# Patient Record
Sex: MALE | Race: WHITE | NOT HISPANIC OR LATINO | Employment: FULL TIME | URBAN - METROPOLITAN AREA
[De-identification: names, ages, dates, MRNs, and addresses within clinical notes are randomized per-mention and may not be internally consistent; named-entity substitution may affect disease eponyms.]

---

## 2019-10-14 ENCOUNTER — RESULTS ENCOUNTER (OUTPATIENT)
Dept: FAMILY MEDICINE CLINIC | Facility: CLINIC | Age: 49
End: 2019-10-14

## 2019-10-14 ENCOUNTER — FLU SHOT (OUTPATIENT)
Dept: FAMILY MEDICINE CLINIC | Facility: CLINIC | Age: 49
End: 2019-10-14

## 2019-10-14 DIAGNOSIS — R53.83 OTHER FATIGUE: ICD-10-CM

## 2019-10-14 DIAGNOSIS — I10 HYPERTENSION, UNSPECIFIED TYPE: ICD-10-CM

## 2019-10-14 DIAGNOSIS — Z23 NEED FOR PROPHYLACTIC VACCINATION AND INOCULATION AGAINST INFLUENZA: ICD-10-CM

## 2019-10-14 DIAGNOSIS — R53.83 OTHER FATIGUE: Primary | ICD-10-CM

## 2019-10-14 DIAGNOSIS — E78.5 DYSLIPIDEMIA: ICD-10-CM

## 2019-10-14 PROBLEM — G47.33 OBSTRUCTIVE SLEEP APNEA: Status: ACTIVE | Noted: 2019-06-10

## 2019-10-14 PROBLEM — E66.9 OBESITY: Status: ACTIVE | Noted: 2019-06-10

## 2019-10-14 PROCEDURE — 90471 IMMUNIZATION ADMIN: CPT | Performed by: FAMILY MEDICINE

## 2019-10-14 PROCEDURE — 90674 CCIIV4 VAC NO PRSV 0.5 ML IM: CPT | Performed by: FAMILY MEDICINE

## 2019-10-15 ENCOUNTER — TELEPHONE (OUTPATIENT)
Dept: FAMILY MEDICINE CLINIC | Facility: CLINIC | Age: 49
End: 2019-10-15

## 2019-10-15 LAB
ALBUMIN SERPL-MCNC: 4.8 G/DL (ref 3.5–5.5)
ALBUMIN/GLOB SERPL: 2 {RATIO} (ref 1.2–2.2)
ALP SERPL-CCNC: 94 IU/L (ref 39–117)
ALT SERPL-CCNC: 35 IU/L (ref 0–44)
AMBIG ABBREV CMP14 DEFAULT: NORMAL
AMBIG ABBREV LP DEFAULT: NORMAL
AST SERPL-CCNC: 22 IU/L (ref 0–40)
BASOPHILS # BLD AUTO: 0 X10E3/UL (ref 0–0.2)
BASOPHILS NFR BLD AUTO: 0 %
BILIRUB SERPL-MCNC: 0.5 MG/DL (ref 0–1.2)
BUN SERPL-MCNC: 16 MG/DL (ref 6–24)
BUN/CREAT SERPL: 16 (ref 9–20)
CALCIUM SERPL-MCNC: 9.7 MG/DL (ref 8.7–10.2)
CHLORIDE SERPL-SCNC: 102 MMOL/L (ref 96–106)
CHOLEST SERPL-MCNC: 229 MG/DL (ref 100–199)
CO2 SERPL-SCNC: 23 MMOL/L (ref 20–29)
CREAT SERPL-MCNC: 0.98 MG/DL (ref 0.76–1.27)
EOSINOPHIL # BLD AUTO: 0.3 X10E3/UL (ref 0–0.4)
EOSINOPHIL NFR BLD AUTO: 4 %
ERYTHROCYTE [DISTWIDTH] IN BLOOD BY AUTOMATED COUNT: 13.2 % (ref 12.3–15.4)
GLOBULIN SER CALC-MCNC: 2.4 G/DL (ref 1.5–4.5)
GLUCOSE SERPL-MCNC: 100 MG/DL (ref 65–99)
HCT VFR BLD AUTO: 45.8 % (ref 37.5–51)
HDLC SERPL-MCNC: 38 MG/DL
HGB BLD-MCNC: 15.8 G/DL (ref 13–17.7)
IMM GRANULOCYTES # BLD AUTO: 0 X10E3/UL (ref 0–0.1)
IMM GRANULOCYTES NFR BLD AUTO: 0 %
LDLC SERPL CALC-MCNC: 152 MG/DL (ref 0–99)
LYMPHOCYTES # BLD AUTO: 2.3 X10E3/UL (ref 0.7–3.1)
LYMPHOCYTES NFR BLD AUTO: 31 %
MCH RBC QN AUTO: 31 PG (ref 26.6–33)
MCHC RBC AUTO-ENTMCNC: 34.5 G/DL (ref 31.5–35.7)
MCV RBC AUTO: 90 FL (ref 79–97)
MONOCYTES # BLD AUTO: 0.4 X10E3/UL (ref 0.1–0.9)
MONOCYTES NFR BLD AUTO: 5 %
NEUTROPHILS # BLD AUTO: 4.4 X10E3/UL (ref 1.4–7)
NEUTROPHILS NFR BLD AUTO: 60 %
PLATELET # BLD AUTO: 178 X10E3/UL (ref 150–450)
POTASSIUM SERPL-SCNC: 4.2 MMOL/L (ref 3.5–5.2)
PROT SERPL-MCNC: 7.2 G/DL (ref 6–8.5)
RBC # BLD AUTO: 5.1 X10E6/UL (ref 4.14–5.8)
SODIUM SERPL-SCNC: 142 MMOL/L (ref 134–144)
TRIGL SERPL-MCNC: 196 MG/DL (ref 0–149)
TSH SERPL DL<=0.005 MIU/L-ACNC: 1.3 UIU/ML (ref 0.45–4.5)
VLDLC SERPL CALC-MCNC: 39 MG/DL (ref 5–40)
WBC # BLD AUTO: 7.4 X10E3/UL (ref 3.4–10.8)

## 2019-10-15 NOTE — TELEPHONE ENCOUNTER
Called patient. No answer. Per HIPAA, may leave VM. VM left advising pt of the above and to bring all medications to appt with him.

## 2019-10-15 NOTE — TELEPHONE ENCOUNTER
----- Message from Benita Vaz MD sent at 10/15/2019  9:46 AM EDT -----  Please inform patient that cholesterol is still above goal with an LDL of 152.  His 10-year atherosclerotic cardiovascular disease risk calculation is 5.3%  it is recommended to start statin therapy when this number reaches 7.5%, which based on current other parameters will happen around the age of 52.  I do recommend low-cholesterol diet, increase physical activity and weight loss for improved neurovascular health.  Glucose was 100, otherwise labs are normal.  Please have him bring all medications to his upcoming appointment and will discuss any needed medication changes/additions.

## 2019-10-18 ENCOUNTER — OFFICE VISIT (OUTPATIENT)
Dept: FAMILY MEDICINE CLINIC | Facility: CLINIC | Age: 49
End: 2019-10-18

## 2019-10-18 VITALS
HEART RATE: 81 BPM | OXYGEN SATURATION: 96 % | DIASTOLIC BLOOD PRESSURE: 90 MMHG | HEIGHT: 75 IN | SYSTOLIC BLOOD PRESSURE: 137 MMHG | TEMPERATURE: 98.2 F | WEIGHT: 315 LBS | BODY MASS INDEX: 39.17 KG/M2

## 2019-10-18 DIAGNOSIS — E78.5 DYSLIPIDEMIA: Primary | ICD-10-CM

## 2019-10-18 DIAGNOSIS — I10 HYPERTENSION, UNSPECIFIED TYPE: ICD-10-CM

## 2019-10-18 DIAGNOSIS — E66.01 CLASS 3 SEVERE OBESITY DUE TO EXCESS CALORIES WITHOUT SERIOUS COMORBIDITY WITH BODY MASS INDEX (BMI) OF 40.0 TO 44.9 IN ADULT (HCC): ICD-10-CM

## 2019-10-18 DIAGNOSIS — J34.89 NASAL SORE: ICD-10-CM

## 2019-10-18 PROCEDURE — 99214 OFFICE O/P EST MOD 30 MIN: CPT | Performed by: FAMILY MEDICINE

## 2019-10-18 RX ORDER — LISINOPRIL 40 MG/1
40 TABLET ORAL EVERY 24 HOURS
Qty: 90 TABLET | Refills: 3 | Status: SHIPPED | OUTPATIENT
Start: 2019-10-18 | End: 2019-12-10 | Stop reason: SDUPTHER

## 2019-10-18 RX ORDER — HYDROXYZINE HYDROCHLORIDE 25 MG/1
1 TABLET, FILM COATED ORAL EVERY 6 HOURS PRN
COMMUNITY
Start: 2019-06-10 | End: 2019-12-10 | Stop reason: SDUPTHER

## 2019-10-18 RX ORDER — LISINOPRIL 40 MG/1
1 TABLET ORAL EVERY 24 HOURS
COMMUNITY
Start: 2019-06-10 | End: 2019-10-18 | Stop reason: SDUPTHER

## 2019-12-10 ENCOUNTER — OFFICE VISIT (OUTPATIENT)
Dept: FAMILY MEDICINE CLINIC | Facility: CLINIC | Age: 49
End: 2019-12-10

## 2019-12-10 VITALS
WEIGHT: 315 LBS | TEMPERATURE: 97.9 F | OXYGEN SATURATION: 99 % | BODY MASS INDEX: 39.17 KG/M2 | HEART RATE: 77 BPM | DIASTOLIC BLOOD PRESSURE: 89 MMHG | SYSTOLIC BLOOD PRESSURE: 129 MMHG | HEIGHT: 75 IN

## 2019-12-10 DIAGNOSIS — E66.01 CLASS 3 SEVERE OBESITY DUE TO EXCESS CALORIES WITHOUT SERIOUS COMORBIDITY WITH BODY MASS INDEX (BMI) OF 40.0 TO 44.9 IN ADULT (HCC): ICD-10-CM

## 2019-12-10 DIAGNOSIS — G47.26 SHIFT WORK SLEEP DISORDER: ICD-10-CM

## 2019-12-10 DIAGNOSIS — G47.00 INSOMNIA, UNSPECIFIED TYPE: ICD-10-CM

## 2019-12-10 DIAGNOSIS — I10 HYPERTENSION, UNSPECIFIED TYPE: Primary | ICD-10-CM

## 2019-12-10 DIAGNOSIS — L29.9 ITCHING: ICD-10-CM

## 2019-12-10 PROCEDURE — 99214 OFFICE O/P EST MOD 30 MIN: CPT | Performed by: FAMILY MEDICINE

## 2019-12-10 RX ORDER — HYDROXYZINE HYDROCHLORIDE 25 MG/1
25 TABLET, FILM COATED ORAL EVERY 6 HOURS PRN
Qty: 30 TABLET | Refills: 3 | Status: SHIPPED | OUTPATIENT
Start: 2019-12-10 | End: 2020-08-24

## 2019-12-10 RX ORDER — LISINOPRIL 40 MG/1
40 TABLET ORAL EVERY 24 HOURS
Qty: 90 TABLET | Refills: 3 | Status: SHIPPED | OUTPATIENT
Start: 2019-12-10

## 2019-12-10 RX ORDER — ZOLPIDEM TARTRATE 10 MG/1
TABLET ORAL
Qty: 30 TABLET | Refills: 1 | Status: SHIPPED | OUTPATIENT
Start: 2019-12-10 | End: 2020-08-24

## 2019-12-10 NOTE — PROGRESS NOTES
Chief Complaint   Patient presents with   • shivering   • tightness in the neck   • Insomnia       Subjective   History of Present Illness   Chris Guerrero is a 48 y.o. male.  He scheduled appointment yesterday due to a shivering spell.  He states he had an controlling shivering but no symptoms of fever or illness.  That has since resolved.  He does report a little tightness in his neck more to as if he had slept on it wrong.    His bigger concern today is that he is not sleeping well he changed jobs and he currently works from 11 PM to 7:30 AM he tries to go to sleep around 10 AM but is only able to sleep for about 2 hours.  He has tried melatonin and it has not helped.    Patient is also requesting refill on lisinopril, states blood pressures are doing good now that he is taking his medication every day.    He is also requesting refill on hydroxyzine for occasional itching.  He states that the itching is actually better in the [place] but he still needs to use the hydroxyzine about once a week.        The following portions of the patient's history were reviewed and updated as appropriate: allergies, current medications, past family history, past medical history, past social history, past surgical history and problem list.    Current Outpatient Medications on File Prior to Visit   Medication Sig   • mupirocin (BACTROBAN) 2 % ointment Apply  topically to the appropriate area as directed 2 (Two) Times a Day. To nasal lesion   • [DISCONTINUED] hydrOXYzine (ATARAX) 25 MG tablet Take 1 tablet by mouth Every 6 (Six) Hours As Needed.   • [DISCONTINUED] lisinopril (PRINIVIL,ZESTRIL) 40 MG tablet Take 1 tablet by mouth Daily.     No current facility-administered medications on file prior to visit.          Review of Systems   Constitutional: Negative for fatigue and fever.   Eyes: Negative for visual disturbance.   Respiratory: Negative for cough, shortness of breath and wheezing.    Cardiovascular: Negative for chest pain,  "palpitations and leg swelling.   Gastrointestinal: Negative.  Negative for abdominal pain.   Musculoskeletal: Positive for myalgias and neck pain.   Skin: Positive for rash.   Psychiatric/Behavioral: Positive for sleep disturbance. Negative for depressed mood.       Past Medical History:   Diagnosis Date   • Hyperlipidemia    • Hypertension    • Obesity    • Sleep apnea        Past Surgical History:   Procedure Laterality Date   • VASECTOMY         Family History   Problem Relation Age of Onset   • Cancer Mother    • Hyperlipidemia Mother    • Cancer Father    • Hyperlipidemia Father    • Cancer Sister    • Hypertension Sister        Social History     Socioeconomic History   • Marital status:      Spouse name: Not on file   • Number of children: Not on file   • Years of education: Not on file   • Highest education level: Not on file   Tobacco Use   • Smoking status: Former Smoker     Last attempt to quit: 2012     Years since quittin.9   • Smokeless tobacco: Never Used   Substance and Sexual Activity   • Alcohol use: Yes     Comment: rare occasion   • Drug use: No   • Sexual activity: Defer       Objective   Vitals:    12/10/19 1514   BP: 129/89   BP Location: Left arm   Patient Position: Sitting   Cuff Size: Adult   Pulse: 77   Temp: 97.9 °F (36.6 °C)   TempSrc: Oral   SpO2: 99%   Weight: (!) 147 kg (323 lb)   Height: 190.5 cm (75\")      Body mass index is 40.37 kg/m².    Physical Exam   Constitutional: He is oriented to person, place, and time. He appears well-developed and well-nourished. No distress.   Eyes: Pupils are equal, round, and reactive to light. Conjunctivae and EOM are normal.   Neck: Normal range of motion.   Cardiovascular: Normal rate and regular rhythm.   No murmur heard.  Pulmonary/Chest: Effort normal. He has no wheezes.   Musculoskeletal: Normal range of motion. He exhibits no edema.   Mild tenderness to palpation of posterior neck muscles, normal range of motion.   Neurological: He " is alert and oriented to person, place, and time.   Skin: Skin is warm and dry.   Left lateral hip with a single excoriated dry patch   Psychiatric: He has a normal mood and affect.   Nursing note and vitals reviewed.      Lab Results   Component Value Date     (H) 10/14/2019    BUN 16 10/14/2019    CREATININE 0.98 10/14/2019    EGFRIFNONA 91 10/14/2019    EGFRIFAFRI 106 10/14/2019     10/14/2019    K 4.2 10/14/2019     10/14/2019    CALCIUM 9.7 10/14/2019    ALBUMIN 4.8 10/14/2019    BILITOT 0.5 10/14/2019    ALKPHOS 94 10/14/2019    AST 22 10/14/2019    ALT 35 10/14/2019    CHLPL 229 (H) 10/14/2019    TRIG 196 (H) 10/14/2019    HDL 38 (L) 10/14/2019    VLDL 39 10/14/2019     (H) 10/14/2019    WBC 7.4 10/14/2019    RBC 5.10 10/14/2019    HCT 45.8 10/14/2019    MCV 90 10/14/2019    MCH 31.0 10/14/2019    TSH 1.300 10/14/2019      No results found for: HGBA1C     Assessment/Plan   Diagnoses and all orders for this visit:    1. Hypertension, unspecified type (Primary)  -     lisinopril (PRINIVIL,ZESTRIL) 40 MG tablet; Take 1 tablet by mouth Daily.  Dispense: 90 tablet; Refill: 3    2. Class 3 severe obesity due to excess calories without serious comorbidity with body mass index (BMI) of 40.0 to 44.9 in adult (CMS/HCC)    3. Insomnia, unspecified type  -     zolpidem (AMBIEN) 10 MG tablet; 1/2 to 1 at bed time if needed for sleep  Dispense: 30 tablet; Refill: 1    4. Shift work sleep disorder  -     zolpidem (AMBIEN) 10 MG tablet; 1/2 to 1 at bed time if needed for sleep  Dispense: 30 tablet; Refill: 1    5. Itching  -     hydrOXYzine (ATARAX) 25 MG tablet; Take 1 tablet by mouth Every 6 (Six) Hours As Needed for Itching.  Dispense: 30 tablet; Refill: 3      Discussed proper sleep hygiene, avoidance of caffeine, laying down in a dark room at an appropriate cool temperature.  Prescription for Ambien, start with 1/2 tablet around 9 AM can increase to whole tablet if needed.  Did warn about  potential amnestic affect.    Hypertension improved, continue lisinopril    Itching, controlled with occasional Atarax, refilling today.         Return in about 3 months (around 3/10/2020) for Recheck hypertension or if needed for ongoing issues with insomnia..      AVS given with handouts appropriate to diagnoses addressed

## 2019-12-10 NOTE — PROGRESS NOTES
Chief Complaint   Patient presents with   • shivering   • tightness in the neck   • Insomnia   Visit Type:  New Problem  Primary Provider:  Milind HERNANDEZ MD    CC:  HTN .    History of Present Illness:  Patient presents in office today to establish at my new practice location  he has been following with me for the past 2-3 years, and previously had seen Dr. Sujey Vaz.  records were [facility] from [facility], however only his sleep   medicine records from Dr Coffey were sent  He has started on CPAP in October and claims to be compliant with therapy.     He is needing to be seen today for a check up on his hypertension. Does not check blood pressure at home, but is symptomatic as to when he is hypertensive as voiced by face being flushed. Does not need refills on any of his medications today. Patient   denies syncope, chest pain, palpitations, headache, or medication side effect. he is Supposed to be taking lisinopril 40 mg daily however he states for the past several months he has not taken any but did take 1 tablet this morning.       Since last seen by provider, he was seen at [facility] in May due to a very tense muscle on the right side of his neck. he was prescribed cyclobenzaprine which was of some benefit, Neck is better now, Records requested.     He is also c/o extensive fatigue.  states he is working a new 3rd shift job, quit  in February, he is also starting a 2nd part-time job.  He reports he is sleeping 6 hours a night.  He has had recent stress.  He states he was arrested for   stealing some boots from [employer], he states he did not do this and has hired an .  Additionally he is being sued for a backing accidnet from 3 years ago.  He denies actual depression, denies suicidal ideation, not interested in antidepressants or   counseling at this time.    He reports return of the rash on his legs this has occurred every summer for several years, he takes  hydroxyzine half a tablet every 3  days and this does control the itching.    Most recent labs on 2018 TSH 1.04, CBC white count 6.1, hemoglobin 14.3, hematocrit 42.3, lipid panel cholesterol 207, HDL 32, , triglycerides 184 CMP within normal limits with a glucose of 89, BUN 14 creatinine 1.0      Vital Signs:    Patient Profile:    48 Years Old Male  Height:     75 inches (190.50 cm)  Weight:     318.4 pounds (144.42 kg)  BMI:        39.79     O2 Sat:     97 %  Temp:       97.9 degrees F (36.61 degrees C) oral  Pulse rate: 78 / minute  Resp:       20 per minute  BP Sittin / 88  (right arm)    Patient has a risk of falls? No  Patient in pain?    No    Problems: Active problems were reviewed with the patient during this visit.  Medications: Medications were reviewed with the patient during this visit.  Allergies: Allergies were reviewed with the patient during this visit.        Vitals Entered By: Lizzy Rojas LPN (Marissa 10, 2019 1:10 PM)    Past History  Past Medical History (reviewed - no changes required): HTN  MRSADepression  GERD  HLD  Hx of Scabies   Anxiety  External Hemorrhoids    Sleep Apnea      Surgical History (reviewed - no changes required): Vasectomy-   Full Mouth Extraction-   Stress Test- - Normal   Family History (reviewed - no changes required): Mother- Small Cell Lung Ca, Arteriosclerotic vascular disease, Seizures, CVA  Father- COPD,  Melanoma  Social History (reviewed - no changes required): Former smoker- smoked for 20+ years; 2.5 PPD  No ETOH   Passive smoke exposure - no  Alcohol Use - no  Drug Use - no  HIV/High Risk - no  Regular Exercise - no      Family History Summary:      Reviewed history and no changes required: 06/10/2019      General Comments - FH:  Mother- Small Cell Lung Ca, Arteriosclerotic vascular disease, Seizures, CVA  Father- COPD,  Melanoma    Social History:     Reviewed history and no changes required:        Former smoker- smoked for  20+ years; 2.5 PPD        No ETOH         Passive smoke exposure - no        Alcohol Use - no        Drug Use - no        HIV/High Risk - no        Regular Exercise - no      Risk Factors:     Smoked Tobacco Use:  Former smoker     Cigarettes:  Yes -- 2.5 pack(s) per day,      Years smoked:  20+  Smokeless Tobacco Use:  Never  Caffeine use:  4-10 drinks per day  Alcohol use:  no  Exercise:  yes     Times per week:  3     Type of Exercise:  walking, treadmill, free weights, elllipticals  Seatbelt use:  100 %  Sun Exposure:  occasionally    Dietary Counseling: yes    Previous Tobacco Use:   Previous Alcohol Use:       Review of Systems     General - Denies fever, chills, sweats.  ENT- Denies earache, nasal congestion, hoarseness and sore throat.  CV- Denies chest pain or discomfort, or palpitations.  Resp- Denies cough and shortness of breath.  GI- Denies indigestion, nausea and vomiting.  MS- Denies joint pain and back pain.  Derm- Denies itching and rash.  Neuro- Denies headache or numbness  Endo-  difficulty losing weight, will weight 230 lb      Physical Exam    General: Well developed, obese  male, in no acute distress.  Neck: No masses, thyromegaly, or abnormal cervical nodes.  Lungs: Clear bilaterally to auscultation. Normal respiratory effort  Heart: Regular rate and rhythm without murmur rub or gallop  Musculoskeletal: No deformity of thoracic or lumbar spine  Extremities: No clubbing, cyanosis, edema, or deformity.  Full range of motion.  Neurologic: No focal deficits.  Cranial nerves intact.  Skin: multiple small excoriated papules on his lower extremities bilaterally  Psych: Alert and cooperative        Blood Pressure:  Today's BP: 132/88 mm Hg          Impression & Recommendations:    Problem # 1:  Hypertension (ICD-401.9) (ROQ54-A26)    His updated medication list for this problem includes:     Lisinopril 40 Mg Oral Tablet (Lisinopril) ..... 1 po qd    Orders:  COMPREHENSIVE METABOLIC PANEL  (CPT-16717)  CBC (INCLUDES DIFF/PLT) (CPT-30150)      Problem # 2:  Obstructive sleep apnea (ICD-327.23) (CCP55-D20.33)    Orders:  CBC (INCLUDES DIFF/PLT) (CPT-14723)  TSH, 3RD GENERATION W/REFLEX TO FT4 (CPT-90896)      Problem # 3:  Obesity (ICD-278.00) (ZQS88-Z91.9)  Assessment: Deteriorated   referral to R program  Orders:  Weight Mgmt ([facility])  COMPREHENSIVE METABOLIC PANEL (CPT-02975)  LIPID PANEL (CPT-17148)  CBC (INCLUDES DIFF/PLT) (CPT-90104)  TSH, 3RD GENERATION W/REFLEX TO FT4 (CPT-58771)      Problem # 4:  Strain of muscle, fascia and tendon at neck level, initial encounter (ICD-847.0) (NPC68-Q95.1xxA)  Assessment: Improved    Problem # 5:  Eczema (ICD-692.9) (OEY21-B74.9)    Problem # 6:  Fatigue (ICD-780.79) (RHX44-E97.83)    Problem # 7:  Noncompliance with medications (ICD-V15.81) (LPU36-Z53.14)    Medications Added to Medication List This Visit:  1)  Cyclobenzaprine Hcl 10 Mg Oral Tablet (Cyclobenzaprine hcl) .... 1 tab po tid x 5 days  2)  Hydroxyzine Hcl 25 Mg Oral Tablet (Hydroxyzine hcl) .... 1 tab po q6h prn  3)  Lisinopril 40 Mg Oral Tablet (Lisinopril) .... 1 po qd    Process Orders  Check Orders Results:      Laboratory: ABN not required for this insurance.  Tests Sent for requisitioning (Marissa 10, 2019 2:19 PM):      06/10/2019: Laboratory -- COMPREHENSIVE METABOLIC PANEL [CPT-26680] (signed)      06/10/2019: Laboratory -- LIPID PANEL [CPT-35189] (signed)      06/10/2019: Laboratory -- CBC (INCLUDES DIFF/PLT) [CPT-58744] (signed)      06/10/2019: Laboratory -- TSH, 3RD GENERATION W/REFLEX TO FT4 [CPT-71395] (signed)          Patient Instructions:  1)   advised to start taking antihypertensive medication regularly however need to monitor for hypotension  if blood pressures less than 100/60 or if lightheaded, dizzy will need to contact our office for reduction of Lisinopril   2)  Return in 1-3 months for blood pressure check                Medication Administration    Orders Added:  1)   "Weight Mgmt [wgtmgmt]  2)  COMPREHENSIVE METABOLIC PANEL [CPT-57331]  3)  LIPID PANEL [CPT-70534]  4)  CBC (INCLUDES DIFF/PLT) [CPT-26273]  5)  TSH, 3RD GENERATION W/REFLEX TO FT4 [CPT-13963]  6)  Ofc Vst, Est Level IV [96204]  ]      Electronically signed by Benita Vaz MD on 06/10/2019 at 7:06 PM  ________________________________________________________________________       Disclaimer: Converted Note message may not contain all data elements that existed in the legacy source system. Please see [employer] for the original note details.      Subjective   History of Present Illness   Chris Guerrero is a 48 y.o. male.           The following portions of the patient's history were reviewed and updated as appropriate: {history reviewed:20406::\"allergies\",\"current medications\",\"past family history\",\"past medical history\",\"past social history\",\"past surgical history\",\"problem list\"}.    Current Outpatient Medications on File Prior to Visit   Medication Sig   • mupirocin (BACTROBAN) 2 % ointment Apply  topically to the appropriate area as directed 2 (Two) Times a Day. To nasal lesion   • [DISCONTINUED] hydrOXYzine (ATARAX) 25 MG tablet Take 1 tablet by mouth Every 6 (Six) Hours As Needed.   • [DISCONTINUED] lisinopril (PRINIVIL,ZESTRIL) 40 MG tablet Take 1 tablet by mouth Daily.     No current facility-administered medications on file prior to visit.          Review of Systems    Past Medical History:   Diagnosis Date   • Hyperlipidemia    • Hypertension    • Obesity    • Sleep apnea        Past Surgical History:   Procedure Laterality Date   • VASECTOMY         Family History   Problem Relation Age of Onset   • Cancer Mother    • Hyperlipidemia Mother    • Cancer Father    • Hyperlipidemia Father    • Cancer Sister    • Hypertension Sister        Social History     Socioeconomic History   • Marital status:      Spouse name: Not on file   • Number of children: Not on file   • Years of " "education: Not on file   • Highest education level: Not on file   Tobacco Use   • Smoking status: Former Smoker     Last attempt to quit: 2012     Years since quittin.9   • Smokeless tobacco: Never Used   Substance and Sexual Activity   • Alcohol use: Yes     Comment: rare occasion   • Drug use: No   • Sexual activity: Defer       Objective   Vitals:    12/10/19 1514   BP: 129/89   BP Location: Left arm   Patient Position: Sitting   Cuff Size: Adult   Pulse: 77   Temp: 97.9 °F (36.6 °C)   TempSrc: Oral   SpO2: 99%   Weight: (!) 147 kg (323 lb)   Height: 190.5 cm (75\")      Body mass index is 40.37 kg/m².    Physical Exam    Lab Results   Component Value Date     (H) 10/14/2019    BUN 16 10/14/2019    CREATININE 0.98 10/14/2019    EGFRIFNONA 91 10/14/2019    EGFRIFAFRI 106 10/14/2019     10/14/2019    K 4.2 10/14/2019     10/14/2019    CALCIUM 9.7 10/14/2019    ALBUMIN 4.8 10/14/2019    BILITOT 0.5 10/14/2019    ALKPHOS 94 10/14/2019    AST 22 10/14/2019    ALT 35 10/14/2019    CHLPL 229 (H) 10/14/2019    TRIG 196 (H) 10/14/2019    HDL 38 (L) 10/14/2019    VLDL 39 10/14/2019     (H) 10/14/2019    WBC 7.4 10/14/2019    RBC 5.10 10/14/2019    HCT 45.8 10/14/2019    MCV 90 10/14/2019    MCH 31.0 10/14/2019    TSH 1.300 10/14/2019      No results found for: HGBA1C     Assessment/Plan   Diagnoses and all orders for this visit:    1. Hypertension, unspecified type (Primary)  -     lisinopril (PRINIVIL,ZESTRIL) 40 MG tablet; Take 1 tablet by mouth Daily.  Dispense: 90 tablet; Refill: 3    2. Class 3 severe obesity due to excess calories without serious comorbidity with body mass index (BMI) of 40.0 to 44.9 in adult (CMS/HCC)    3. Insomnia, unspecified type  -     zolpidem (AMBIEN) 10 MG tablet; 1/2 to 1 at bed time if needed for sleep  Dispense: 30 tablet; Refill: 1    4. Shift work sleep disorder  -     zolpidem (AMBIEN) 10 MG tablet; 1/2 to 1 at bed time if needed for sleep  Dispense: 30 " tablet; Refill: 1    5. Itching  -     hydrOXYzine (ATARAX) 25 MG tablet; Take 1 tablet by mouth Every 6 (Six) Hours As Needed for Itching.  Dispense: 30 tablet; Refill: 3               No follow-ups on file.      AVS given with handouts appropriate to diagnoses addressed

## 2020-01-17 ENCOUNTER — TELEPHONE (OUTPATIENT)
Dept: FAMILY MEDICINE CLINIC | Facility: CLINIC | Age: 50
End: 2020-01-17

## 2020-01-17 NOTE — TELEPHONE ENCOUNTER
Please let him know that I am happy he is working days only, this should improve his sleep issues.  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  When I saw him in December I had intended to see him back in 3 months for a blood pressure check.  Please have him reschedule for March, thank you

## 2020-01-17 NOTE — TELEPHONE ENCOUNTER
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

## 2020-03-10 DIAGNOSIS — J32.9 OTHER SINUSITIS, UNSPECIFIED CHRONICITY: Primary | ICD-10-CM

## 2020-03-10 RX ORDER — CLARITHROMYCIN 500 MG/1
500 TABLET, COATED ORAL 2 TIMES DAILY
Qty: 20 TABLET | Refills: 0 | Status: SHIPPED | OUTPATIENT
Start: 2020-03-10 | End: 2020-08-24

## 2020-08-24 ENCOUNTER — OFFICE VISIT (OUTPATIENT)
Dept: FAMILY MEDICINE CLINIC | Facility: CLINIC | Age: 50
End: 2020-08-24

## 2020-08-24 VITALS
OXYGEN SATURATION: 96 % | SYSTOLIC BLOOD PRESSURE: 146 MMHG | BODY MASS INDEX: 39.17 KG/M2 | WEIGHT: 315 LBS | TEMPERATURE: 98.4 F | DIASTOLIC BLOOD PRESSURE: 78 MMHG | HEIGHT: 75 IN | RESPIRATION RATE: 18 BRPM | HEART RATE: 90 BPM

## 2020-08-24 DIAGNOSIS — Z02.89 ENCOUNTER FOR FEDERAL AVIATION ADMINISTRATION (FAA) EXAMINATION: Primary | ICD-10-CM

## 2020-08-24 DIAGNOSIS — G47.33 OBSTRUCTIVE SLEEP APNEA: ICD-10-CM

## 2020-08-24 PROCEDURE — FAA3PHY: Performed by: FAMILY MEDICINE

## 2020-08-24 NOTE — PROGRESS NOTES
"Subjective   Chris Guerrero is a 49 y.o. male.     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       The following portions of the patient's history were reviewed and updated as appropriate: current medications, past family history, past medical history, past social history, past surgical history and problem list.    Family History   Problem Relation Age of Onset   • Cancer Mother    • Hyperlipidemia Mother    • Cancer Father    • Hyperlipidemia Father    • Cancer Sister    • Hypertension Sister        Social History     Tobacco Use   • Smoking status: Former Smoker     Last attempt to quit: 2012     Years since quittin.6   • Smokeless tobacco: Never Used   Substance Use Topics   • Alcohol use: Yes     Comment: rare occasion   • Drug use: No       Past Surgical History:   Procedure Laterality Date   • VASECTOMY         Patient Active Problem List   Diagnosis   • Dyslipidemia   • Hypertension   • Obesity   • Obstructive sleep apnea   • Encounter for Federal Aviation Administration (FAA) examination       Current Outpatient Medications on File Prior to Visit   Medication Sig Dispense Refill   • lisinopril (PRINIVIL,ZESTRIL) 40 MG tablet Take 1 tablet by mouth Daily. 90 tablet 3     No current facility-administered medications on file prior to visit.        Allergies   Allergen Reactions   • Codeine GI Intolerance   • Nsaids Other (See Comments)       Review of Systems   All other systems reviewed and are negative.      Objective   Visit Vitals  /78 (BP Location: Left arm, Patient Position: Sitting, Cuff Size: Large Adult)   Pulse 90   Temp 98.4 °F (36.9 °C) (Oral)   Resp 18   Ht 190.5 cm (75\")   Wt (!) 153 kg (337 lb)   SpO2 96%   BMI 42.12 kg/m²     Physical Exam   Constitutional: He is oriented to person, place, and time. He appears well-developed and well-nourished. He is cooperative. No " distress.   HENT:   Head: Normocephalic and atraumatic.   Right Ear: External ear normal.   Left Ear: External ear normal.   Mouth/Throat: Oropharynx is clear and moist. No oropharyngeal exudate.   Eyes: Pupils are equal, round, and reactive to light. Conjunctivae, EOM and lids are normal. Lids are everted and swept, no foreign bodies found. Right eye exhibits no discharge and no exudate. Left eye exhibits no discharge and no exudate. Right conjunctiva is not injected. Left conjunctiva is not injected.   Fundoscopic exam:       The right eye shows no AV nicking, no exudate, no hemorrhage and no papilledema.        The left eye shows no AV nicking, no exudate, no hemorrhage and no papilledema.   Visual field normal to 90 degrees bilat   Neck: Trachea normal and normal range of motion. Neck supple. Carotid bruit is not present. No thyromegaly present.   Cardiovascular: Normal rate, regular rhythm, normal heart sounds and intact distal pulses. Exam reveals no gallop and no friction rub.   No murmur heard.  Pulmonary/Chest: Effort normal and breath sounds normal. No respiratory distress. He has no wheezes. He exhibits no tenderness.   Abdominal: Soft. Bowel sounds are normal. He exhibits no distension. There is no tenderness. There is no rebound and no guarding. No hernia.   Genitourinary:   Genitourinary Comments: Penis normal  testicle descended bilataral without abnorm   Musculoskeletal: Normal range of motion. He exhibits no edema, tenderness or deformity.   Lymphadenopathy:     He has no cervical adenopathy.        Right cervical: No superficial cervical adenopathy present.       Left cervical: No superficial cervical adenopathy present.   Neurological: He is alert and oriented to person, place, and time. He displays normal reflexes. No cranial nerve deficit or sensory deficit. He exhibits normal muscle tone. Coordination and gait normal.   Skin: Skin is warm and dry. No rash noted. He is not diaphoretic. No  erythema. Nails show no clubbing.   Psychiatric: He has a normal mood and affect. His behavior is normal. Judgment and thought content normal.   Nursing note and vitals reviewed.        Assessment/Plan .  Problem List Items Addressed This Visit        Unprioritized    Encounter for Federal Aviation Administration (FAA) examination - Primary    Current Assessment & Plan     Patient was deferred for the certificate due to sleep apnea. Called and spoke with Jamal at [facility] and he recommended that we defer him at this time.  Ua done today and was normal.  He also had Atarax and Ambien listed as medications that he took but he states that he was prescribed these once and took them once and they made him feel bad and never took them again.         Obstructive sleep apnea    Current Assessment & Plan     Patient brought in a form from Dr. 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  I did fax this to the FAA however they may need their own form.

## 2020-08-25 NOTE — ASSESSMENT & PLAN NOTE
Patient was deferred for the certificate due to sleep apnea. Called and spoke with Jamal at [facility] and he recommended that we defer him at this time.  Ua done today and was normal.  He also had Atarax and Ambien listed as medications that he took but he states that he was prescribed these once and took them once and they made him feel bad and never took them again.

## 2020-08-25 NOTE — ASSESSMENT & PLAN NOTE
Patient brought in a form from Dr. 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  I did fax this to the FAA however they may need their own form.